# Patient Record
Sex: MALE | Race: WHITE | NOT HISPANIC OR LATINO | Employment: FULL TIME | URBAN - NONMETROPOLITAN AREA
[De-identification: names, ages, dates, MRNs, and addresses within clinical notes are randomized per-mention and may not be internally consistent; named-entity substitution may affect disease eponyms.]

---

## 2017-02-07 ENCOUNTER — OFFICE VISIT (OUTPATIENT)
Dept: CARDIOLOGY | Facility: CLINIC | Age: 57
End: 2017-02-07
Payer: COMMERCIAL

## 2017-02-07 VITALS
WEIGHT: 198 LBS | HEIGHT: 72 IN | SYSTOLIC BLOOD PRESSURE: 160 MMHG | OXYGEN SATURATION: 97 % | BODY MASS INDEX: 26.82 KG/M2 | DIASTOLIC BLOOD PRESSURE: 80 MMHG | HEART RATE: 57 BPM

## 2017-02-07 DIAGNOSIS — I42.0 CARDIOMYOPATHY, DILATED, NONISCHEMIC (HCC): ICD-10-CM

## 2017-02-07 DIAGNOSIS — E78.2 MIXED HYPERLIPIDEMIA: ICD-10-CM

## 2017-02-07 DIAGNOSIS — I10 ESSENTIAL HYPERTENSION: ICD-10-CM

## 2017-02-07 DIAGNOSIS — I34.0 NON-RHEUMATIC MITRAL REGURGITATION: ICD-10-CM

## 2017-02-07 PROCEDURE — 99214 OFFICE O/P EST MOD 30 MIN: CPT | Performed by: INTERNAL MEDICINE

## 2017-02-07 RX ORDER — CARVEDILOL 25 MG/1
25 TABLET ORAL 2 TIMES DAILY WITH MEALS
Qty: 180 TAB | Refills: 3 | Status: SHIPPED | OUTPATIENT
Start: 2017-02-07 | End: 2018-02-16 | Stop reason: SDUPTHER

## 2017-02-07 RX ORDER — ENALAPRIL MALEATE 20 MG/1
20 TABLET ORAL DAILY
Qty: 180 TAB | Refills: 3 | Status: SHIPPED | OUTPATIENT
Start: 2017-02-07 | End: 2017-03-30 | Stop reason: SDUPTHER

## 2017-02-07 ASSESSMENT — ENCOUNTER SYMPTOMS
HEADACHES: 0
SPEECH CHANGE: 0
BRUISES/BLEEDS EASILY: 0
COUGH: 0
NERVOUS/ANXIOUS: 0
LOSS OF CONSCIOUSNESS: 0
DEPRESSION: 0
ORTHOPNEA: 0
MYALGIAS: 0
PALPITATIONS: 0
ABDOMINAL PAIN: 0
CLAUDICATION: 0
PND: 0
BLURRED VISION: 0
SHORTNESS OF BREATH: 0
FEVER: 0

## 2017-02-07 NOTE — Clinical Note
[facility] [address],   [address]nd [address]  [place] [address]  Phone: [phone]  Fax: [phone]              Lobo Jones  1960    Encounter Date: 2/7/2017    Alysa Thakkar M.D.          PROGRESS NOTE:  Subjective:   Lobo Jones is a pleasant 56 y.o. male with known history of NICMP LVEF 26% based on coronary angiogram performed in December 2003, echocardiogram from 2011 normal LV function with mild mitral regurgitation, hypertension, dyslipidemia presenting today for follow-up evaluation of NICMP.    Blood pressure is elevated today. Palpation at home systolic blood pressures are usually around 120s 130s. Denied any complaints of exertional chest pain, pressure or tightness. No complaints of dizziness, lightheadedness or LOC. Denied any complaints of cough while on enalapril. No complaints of lower extremity edema or claudication.      Past Medical History   Diagnosis Date   • Nonischemic cardiomyopathy (CMS-HCC)    • Hyperlipidemia    • Hypertension    • Arrhythmia      hx of irreg heart rate   • Dental disorder      upper partial     Past Surgical History   Procedure Laterality Date   • Inguinal hernia repair       x2   • Colonoscopy - endo  1/12/2015     Performed by Mariano Li M.D. at [facility] ORS     History reviewed. No pertinent family history.  History   Smoking status   • Former Smoker -- 1.00 packs/day for 20 years   • Types: Cigarettes   • Quit date: 01/01/2008   Smokeless tobacco   • Never Used     No Known Allergies  Outpatient Encounter Prescriptions as of 2/7/2017   Medication Sig Dispense Refill   • enalapril (VASOTEC) 20 MG tablet Take 1 Tab by mouth 2 times a day. 180 Tab 0   • carvedilol (COREG) 25 MG Tab Take 1 Tab by mouth 2 times a day, with meals. 180 Tab 4   • aspirin EC (ECOTRIN) 81 MG TBEC Take 81 mg by mouth every day.     • docosahexanoic acid (OMEGA 3 FA) 1000 MG CAPS Take 1,000  "mg by mouth every day.       No facility-administered encounter medications on file as of 2/7/2017.     Review of Systems   Constitutional: Negative for fever.   HENT: Negative for congestion.    Eyes: Negative for blurred vision.   Respiratory: Negative for cough and shortness of breath.    Cardiovascular: Negative for chest pain, palpitations, orthopnea, claudication, leg swelling and PND.   Gastrointestinal: Negative for abdominal pain.   Musculoskeletal: Negative for myalgias and joint pain.   Skin: Negative for rash.   Neurological: Negative for speech change, loss of consciousness and headaches.   Endo/Heme/Allergies: Does not bruise/bleed easily.   Psychiatric/Behavioral: Negative for depression. The patient is not nervous/anxious.    All other systems reviewed and are negative.       Objective:   /80 mmHg  Pulse 57  Ht 1.829 m (6' 0.01\")  Wt 89.812 kg (198 lb)  BMI 26.85 kg/m2  SpO2 97%  Repeat blood pressure 140/84  Physical Exam   Constitutional: He is oriented to person, place, and time. He appears well-developed. No distress.   HENT:   Mouth/Throat: Mucous membranes are normal.   Eyes: Conjunctivae and EOM are normal.   Neck: No JVD present. No tracheal deviation present. No thyroid mass present.   Cardiovascular: Normal rate, regular rhythm and intact distal pulses.    Murmur heard.  Soft systolic murmur best heard in the mitral area   Pulmonary/Chest: Effort normal and breath sounds normal. No respiratory distress. He exhibits no tenderness.   Abdominal: Soft. There is no tenderness.   Musculoskeletal: Normal range of motion. He exhibits no edema.   Neurological: He is alert and oriented to person, place, and time. He has normal strength. He displays no tremor.   Skin: Skin is warm and dry. He is not diaphoretic.   Psychiatric: He has a normal mood and affect. His behavior is normal.   Vitals reviewed.  Results for DLOORES CARISSA TRAN (MRN [id number]) as of 2/7/2017 14:10   10/7/2016    Sodium 142 "   Potassium 4.6   Chloride 107   Co2 26   Anion Gap 14   Glucose 111 (H)   Bun 27 (H)   Creatinine 1.1   GFR If Non African American >60   Calcium 8.7   AST(SGOT) 27   ALT(SGPT) 32   Alkaline Phosphatase 71   Cholesterol,Tot 195   Triglycerides 60   HDL 65.0 (H)   Non HDL Cholesterol 130    (H)   Chol-Hdl Ratio 3.00       Labs 4/24/15  Sodium 135, potassium 4.2, chloride 107, bicarbonate 21, glucose 103, BUN 25, creatinine 0.89  Alkaline phosphatase 46, ALT 22, AST 24  Total cholesterol 180, triglycerides 75, HDL 62,     Labs 9/26/14  Sodium 136, potassium 4.7, chloride 105, bicarbonate 26, glucose 113, BUN 26, creatinine 1  Alkaline phosphatase 45, ALT 24, AST 23.  Cholesterol 204, triglycerides 40, HDL 56,     Transthoracic echo: 9/26/2011  Mild concentric LVH, systolic function normal, EF 69% no wall motion abnormalities. Abnormal diastolic filling with impaired relaxation.  The evidence of coarctation or aneurysm of thoracic aorta.  Aortic valve tricuspid no evidence of stenosis or regurgitation.  Mild mitral regurgitation  Normal right heart pressures.  No pericardial effusion.    Coronary angiogram: 12/16/2003  LV gram showed global hypokinesis with EF of 26%. No mitral regurgitation.  LM bifurcated into the LAD and LCX which is free of disease.  LAD is a large vessel wraps around the apex no significant disease.  LCX angiographically free of CAD  RCA dominant vessel gives off small PDA and PLV free of disease and  Assessment:     1. NICMP  2. HTN  3. Dyslipidemia  4. Mitral regurgitation     Medical Decision Making:  Today's Assessment / Status / Plan:     1. Patient appears euvolemic.  Continue Coreg and enalapril.  2. Repeat blood pressure dropped to 140/84  Advised patient to monitor blood pressure for one week based on the blood pressure recording will titrate antihypertensive agents.  Interim continue Coreg and enalapril.  3. Dietary modification and continue exercise.  4. Repeat  echo in one year prior to next visit.    Followup in one year  Labs in one year, echo prior to next visit.    Thank you for allowing me to participate in taking care of patient.    Alysa Thakkar. MD.             Miguel Wells M.D.  [address] [facility] [address]  VIA In Basket

## 2017-02-07 NOTE — MR AVS SNAPSHOT
"        Lobo Jones   2017 11:00 AM   Office Visit   MRN: [id number]    Department:  Heart [facility]   Dept Phone:  [phone]    Description:  Male : 1960   Provider:  Alysa Thakkar M.D.           Reason for Visit     Follow-Up           Allergies as of 2017     No Known Allergies      You were diagnosed with     Mixed hyperlipidemia   [272.2.ICD-9-CM]       Essential hypertension   [6895601]       Non-rheumatic mitral regurgitation   [008928]       Cardiomyopathy, dilated, nonischemic (CMS-HCC)   [575832]         Vital Signs     Blood Pressure Pulse Height Weight Body Mass Index Oxygen Saturation    160/80 mmHg 57 1.829 m (6' 0.01\") 89.812 kg (198 lb) 26.85 kg/m2 97%    Smoking Status                   Former Smoker           Basic Information     Date Of Birth Sex Race Ethnicity Preferred Language    1960 Male White Non- English      Problem List              ICD-10-CM Priority Class Noted - Resolved    CARDIOMYOPATHY  High  Unknown - Present    Hyperlipidemia E78.5 High  Unknown - Present    Hypertension I10 High  Unknown - Present    Cardiomyopathy, dilated, nonischemic (CMS-HCC) I42.9   10/6/2014 - Present    Mitral regurgitation I34.0   2015 - Present      Health Maintenance        Date Due Completion Dates    DIABETES MONOFILAMENT / LE EXAM 1962 ---    RETINAL SCREENING 1979 ---    URINE ACR / MICROALBUMIN 1979 ---    IMM DTaP/Tdap/Td Vaccine (1 - Tdap) 1980 ---    IMM PNEUMOCOCCAL 19-64 (ADULT) MEDIUM RISK SERIES (1 of 1 - PPSV23) 1980 ---    IMM INFLUENZA (1) 2015    A1C SCREENING 2016, 2015    FASTING LIPID PROFILE 10/7/2017 10/7/2016, 2014    SERUM CREATININE 10/7/2017 10/7/2016, 2014    COLONOSCOPY 1/15/2020 1/15/2015 (Done), 2015    Override on 1/15/2015: Done            Current Immunizations     Hepatitis B Vaccine Recombivax (Adol/Adult) 2013, 2013  8:28 AM, 2013  "    Influenza TIV (IM) 11/18/2015      Below and/or attached are the medications your provider expects you to take. Review all of your home medications and newly ordered medications with your provider and/or pharmacist. Follow medication instructions as directed by your provider and/or pharmacist. Please keep your medication list with you and share with your provider. Update the information when medications are discontinued, doses are changed, or new medications (including over-the-counter products) are added; and carry medication information at all times in the event of emergency situations     Allergies:  No Known Allergies          Medications  Valid as of: February 07, 2017 - 11:18 AM    Generic Name Brand Name Tablet Size Instructions for use    Aspirin (Tablet Delayed Response) ECOTRIN 81 MG Take 81 mg by mouth every day.        Carvedilol (Tab) COREG 25 MG Take 1 Tab by mouth 2 times a day, with meals.        Enalapril Maleate (Tab) VASOTEC 20 MG Take 1 Tab by mouth every day.        Omega-3 Fatty Acids (Cap) OMEGA 3 FA 1000 MG Take 1,000 mg by mouth every day.        .                 Medicines prescribed today were sent to:     [facility] [address] [place] [address] - [address] [place], [place] [address] [facility] [address]    Phone: [phone] Fax: [phone]    Open 24 Hours?: No      Medication refill instructions:       If your prescription bottle indicates you have medication refills left, it is not necessary to call your provider’s office. Please contact your pharmacy and they will refill your medication.    If your prescription bottle indicates you do not have any refills left, you may request refills at any time through one of the following ways: The online [employer] system (except Urgent Care), by calling your provider’s office, or by asking your pharmacy to contact your provider’s office with a refill request. Medication refills are processed only during regular business hours and may not be  available until the next business day. Your provider may request additional information or to have a follow-up visit with you prior to refilling your medication.   *Please Note: Medication refills are assigned a new Rx number when refilled electronically. Your pharmacy may indicate that no refills were authorized even though a new prescription for the same medication is available at the pharmacy. Please request the medicine by name with the pharmacy before contacting your provider for a refill.        Your To Do List     Future Labs/Procedures Complete By Expires    COMP METABOLIC PANEL  As directed 2/7/2018    Echocardiogram Comp w/o Cont  As directed 2/7/2018    Scheduling Instructions:    In 1 year    LIPID PROFILE  As directed 2/7/2018         bSafe Access Code: [email or web address]  Expires: 3/9/2017 10:48 AM    bSafe  A secure, online tool to manage your health information     [employer]’s bSafe® is a secure, online tool that connects you to your personalized health information from the privacy of your home -- day or night - making it very easy for you to manage your healthcare. Once the activation process is completed, you can even access your medical information using the bSafe luz, which is available for free in the Apple Luz store or Google Play store.     bSafe provides the following levels of access (as shown below):   My Chart Features   [facility] Doctor [facility]  Specialists [facility]  Urgent  Care Non-[facility]  Primary Care  Doctor   Email your healthcare team securely and privately 24/7 X X X    Manage appointments: schedule your next appointment; view details of past/upcoming appointments X      Request prescription refills. X      View recent personal medical records, including lab and immunizations X X X X   View health record, including health history, allergies, medications X X X X   Read reports about your outpatient visits, procedures, consult and ER notes X X X X   See your  discharge summary, which is a recap of your hospital and/or ER visit that includes your diagnosis, lab results, and care plan. X X       How to register for The Rounds:  1. Go to  [email or web address].  2. Click on the Sign Up Now box, which takes you to the New Member Sign Up page. You will need to provide the following information:  a. Enter your The Rounds Access Code exactly as it appears at the top of this page. (You will not need to use this code after you’ve completed the sign-up process. If you do not sign up before the expiration date, you must request a new code.)   b. Enter your date of birth.   c. Enter your home email address.   d. Click Submit, and follow the next screen’s instructions.  3. Create a The Rounds ID. This will be your The Rounds login ID and cannot be changed, so think of one that is secure and easy to remember.  4. Create a The Rounds password. You can change your password at any time.  5. Enter your Password Reset Question and Answer. This can be used at a later time if you forget your password.   6. Enter your e-mail address. This allows you to receive e-mail notifications when new information is available in The Rounds.  7. Click Sign Up. You can now view your health information.    For assistance activating your The Rounds account, call [phone]

## 2017-02-07 NOTE — PROGRESS NOTES
Subjective:   Lobo Jones is a pleasant 56 y.o. male with known history of NICMP LVEF 26% based on coronary angiogram performed in December 2003, echocardiogram from 2011 normal LV function with mild mitral regurgitation, hypertension, dyslipidemia presenting today for follow-up evaluation of NICMP.    Blood pressure is elevated today. Palpation at home systolic blood pressures are usually around 120s 130s. Denied any complaints of exertional chest pain, pressure or tightness. No complaints of dizziness, lightheadedness or LOC. Denied any complaints of cough while on enalapril. No complaints of lower extremity edema or claudication.      Past Medical History   Diagnosis Date   • Nonischemic cardiomyopathy (CMS-HCC)    • Hyperlipidemia    • Hypertension    • Arrhythmia      hx of irreg heart rate   • Dental disorder      upper partial     Past Surgical History   Procedure Laterality Date   • Inguinal hernia repair       x2   • Colonoscopy - endo  1/12/2015     Performed by Mariano Li M.D. at [facility] ORS     History reviewed. No pertinent family history.  History   Smoking status   • Former Smoker -- 1.00 packs/day for 20 years   • Types: Cigarettes   • Quit date: 01/01/2008   Smokeless tobacco   • Never Used     No Known Allergies  Outpatient Encounter Prescriptions as of 2/7/2017   Medication Sig Dispense Refill   • enalapril (VASOTEC) 20 MG tablet Take 1 Tab by mouth 2 times a day. 180 Tab 0   • carvedilol (COREG) 25 MG Tab Take 1 Tab by mouth 2 times a day, with meals. 180 Tab 4   • aspirin EC (ECOTRIN) 81 MG TBEC Take 81 mg by mouth every day.     • docosahexanoic acid (OMEGA 3 FA) 1000 MG CAPS Take 1,000 mg by mouth every day.       No facility-administered encounter medications on file as of 2/7/2017.     Review of Systems   Constitutional: Negative for fever.   HENT: Negative for congestion.    Eyes: Negative for blurred vision.   Respiratory: Negative for cough and shortness of  "breath.    Cardiovascular: Negative for chest pain, palpitations, orthopnea, claudication, leg swelling and PND.   Gastrointestinal: Negative for abdominal pain.   Musculoskeletal: Negative for myalgias and joint pain.   Skin: Negative for rash.   Neurological: Negative for speech change, loss of consciousness and headaches.   Endo/Heme/Allergies: Does not bruise/bleed easily.   Psychiatric/Behavioral: Negative for depression. The patient is not nervous/anxious.    All other systems reviewed and are negative.       Objective:   /80 mmHg  Pulse 57  Ht 1.829 m (6' 0.01\")  Wt 89.812 kg (198 lb)  BMI 26.85 kg/m2  SpO2 97%  Repeat blood pressure 140/84  Physical Exam   Constitutional: He is oriented to person, place, and time. He appears well-developed. No distress.   HENT:   Mouth/Throat: Mucous membranes are normal.   Eyes: Conjunctivae and EOM are normal.   Neck: No JVD present. No tracheal deviation present. No thyroid mass present.   Cardiovascular: Normal rate, regular rhythm and intact distal pulses.    Murmur heard.  Soft systolic murmur best heard in the mitral area   Pulmonary/Chest: Effort normal and breath sounds normal. No respiratory distress. He exhibits no tenderness.   Abdominal: Soft. There is no tenderness.   Musculoskeletal: Normal range of motion. He exhibits no edema.   Neurological: He is alert and oriented to person, place, and time. He has normal strength. He displays no tremor.   Skin: Skin is warm and dry. He is not diaphoretic.   Psychiatric: He has a normal mood and affect. His behavior is normal.   Vitals reviewed.  Results for ANNCARISSA JONES TRAN (MRN [id number]) as of 2/7/2017 14:10   10/7/2016    Sodium 142   Potassium 4.6   Chloride 107   Co2 26   Anion Gap 14   Glucose 111 (H)   Bun 27 (H)   Creatinine 1.1   GFR If Non African American >60   Calcium 8.7   AST(SGOT) 27   ALT(SGPT) 32   Alkaline Phosphatase 71   Cholesterol,Tot 195   Triglycerides 60   HDL 65.0 (H)   Non HDL " Cholesterol 130    (H)   Chol-Hdl Ratio 3.00       Labs 4/24/15  Sodium 135, potassium 4.2, chloride 107, bicarbonate 21, glucose 103, BUN 25, creatinine 0.89  Alkaline phosphatase 46, ALT 22, AST 24  Total cholesterol 180, triglycerides 75, HDL 62,     Labs 9/26/14  Sodium 136, potassium 4.7, chloride 105, bicarbonate 26, glucose 113, BUN 26, creatinine 1  Alkaline phosphatase 45, ALT 24, AST 23.  Cholesterol 204, triglycerides 40, HDL 56,     Transthoracic echo: 9/26/2011  Mild concentric LVH, systolic function normal, EF 69% no wall motion abnormalities. Abnormal diastolic filling with impaired relaxation.  The evidence of coarctation or aneurysm of thoracic aorta.  Aortic valve tricuspid no evidence of stenosis or regurgitation.  Mild mitral regurgitation  Normal right heart pressures.  No pericardial effusion.    Coronary angiogram: 12/16/2003  LV gram showed global hypokinesis with EF of 26%. No mitral regurgitation.  LM bifurcated into the LAD and LCX which is free of disease.  LAD is a large vessel wraps around the apex no significant disease.  LCX angiographically free of CAD  RCA dominant vessel gives off small PDA and PLV free of disease and  Assessment:     1. NICMP  2. HTN  3. Dyslipidemia  4. Mitral regurgitation     Medical Decision Making:  Today's Assessment / Status / Plan:     1. Patient appears euvolemic.  Continue Coreg and enalapril.  2. Repeat blood pressure dropped to 140/84  Advised patient to monitor blood pressure for one week based on the blood pressure recording will titrate antihypertensive agents.  Interim continue Coreg and enalapril.  3. Dietary modification and continue exercise.  4. Repeat echo in one year prior to next visit.    Followup in one year  Labs in one year, echo prior to next visit.    Thank you for allowing me to participate in taking care of patient.    Alysa Khalil MD.

## 2017-03-30 DIAGNOSIS — I10 ESSENTIAL HYPERTENSION: ICD-10-CM

## 2017-03-30 DIAGNOSIS — I42.0 CARDIOMYOPATHY, DILATED, NONISCHEMIC (HCC): ICD-10-CM

## 2017-03-30 RX ORDER — ENALAPRIL MALEATE 20 MG/1
20 TABLET ORAL 2 TIMES DAILY
Qty: 180 TAB | Refills: 3 | OUTPATIENT
Start: 2017-03-30 | End: 2018-02-16 | Stop reason: SDUPTHER

## 2018-02-09 DIAGNOSIS — E78.2 MIXED HYPERLIPIDEMIA: ICD-10-CM

## 2018-02-14 ENCOUNTER — APPOINTMENT (OUTPATIENT)
Dept: CARDIOLOGY | Facility: MEDICAL CENTER | Age: 58
End: 2018-02-14
Attending: INTERNAL MEDICINE
Payer: COMMERCIAL

## 2018-02-16 ENCOUNTER — OFFICE VISIT (OUTPATIENT)
Dept: CARDIOLOGY | Facility: PHYSICIAN GROUP | Age: 58
End: 2018-02-16
Payer: COMMERCIAL

## 2018-02-16 VITALS
DIASTOLIC BLOOD PRESSURE: 82 MMHG | BODY MASS INDEX: 26.95 KG/M2 | SYSTOLIC BLOOD PRESSURE: 140 MMHG | WEIGHT: 199 LBS | HEART RATE: 58 BPM | HEIGHT: 72 IN | OXYGEN SATURATION: 95 %

## 2018-02-16 DIAGNOSIS — E78.2 MIXED HYPERLIPIDEMIA: ICD-10-CM

## 2018-02-16 DIAGNOSIS — I42.0 CARDIOMYOPATHY, DILATED, NONISCHEMIC (HCC): ICD-10-CM

## 2018-02-16 DIAGNOSIS — I10 ESSENTIAL HYPERTENSION: ICD-10-CM

## 2018-02-16 PROCEDURE — 99214 OFFICE O/P EST MOD 30 MIN: CPT | Performed by: INTERNAL MEDICINE

## 2018-02-16 RX ORDER — CARVEDILOL 25 MG/1
25 TABLET ORAL 2 TIMES DAILY WITH MEALS
Qty: 180 TAB | Refills: 3 | Status: SHIPPED | OUTPATIENT
Start: 2018-02-16 | End: 2019-01-09 | Stop reason: SDUPTHER

## 2018-02-16 RX ORDER — ENALAPRIL MALEATE 20 MG/1
20 TABLET ORAL 2 TIMES DAILY
Qty: 60 TAB | Refills: 11 | Status: SHIPPED | OUTPATIENT
Start: 2018-02-16 | End: 2018-08-27

## 2018-02-16 ASSESSMENT — ENCOUNTER SYMPTOMS
CLAUDICATION: 0
ORTHOPNEA: 0
COUGH: 0
SPEECH CHANGE: 0
HEADACHES: 0
LOSS OF CONSCIOUSNESS: 0
FEVER: 0
NERVOUS/ANXIOUS: 0
BRUISES/BLEEDS EASILY: 0
SHORTNESS OF BREATH: 0
BLURRED VISION: 0
ABDOMINAL PAIN: 0
DEPRESSION: 0
PND: 0
PALPITATIONS: 0
MYALGIAS: 0

## 2018-02-16 NOTE — PROGRESS NOTES
Subjective:   Lobo Jones is a pleasant 56 y.o. male with known history of   1. NICMP LVEF 26% based on coronary angiogram performed in December 2003, Echocardiogram from 2011 normal LV function   2. Mitral regurgitation  3. Hypertension  4. Dyslipidemia   Presenting today for follow-up evaluation of NICMP.    Patient is working out on a treadmill every third day. Is able to walk for 20 minutes at a speed of 3 or 5 miles. Denied any complaints of exertional chest pain, pressure or tightness. No complaints of dyspnea at rest or on exertion. Denied any complaints of orthopnea or PND. No complaints of dizziness lightheadedness or LOC. Recent lab work did show elevated glucose as well as LDL level patient wants to try dietary modification and exercise before considering medications.      Past Medical History:   Diagnosis Date   • Arrhythmia     hx of irreg heart rate   • Dental disorder     upper partial   • Hyperlipidemia    • Hypertension    • Nonischemic cardiomyopathy (CMS-HCC)      Past Surgical History:   Procedure Laterality Date   • COLONOSCOPY - ENDO  1/12/2015    Performed by Mariano Li M.D. at SURGERY [facility] ORS   • INGUINAL HERNIA REPAIR      x2     No family history on file.  History   Smoking Status   • Former Smoker   • Packs/day: 1.00   • Years: 20.00   • Types: Cigarettes   • Quit date: 1/1/2008   Smokeless Tobacco   • Never Used     No Known Allergies  Outpatient Encounter Prescriptions as of 2/16/2018   Medication Sig Dispense Refill   • enalapril (VASOTEC) 20 MG tablet Take 1 Tab by mouth 2 times a day. 60 Tab 11   • carvedilol (COREG) 25 MG Tab Take 1 Tab by mouth 2 times a day, with meals. 180 Tab 3   • aspirin EC (ECOTRIN) 81 MG TBEC Take 81 mg by mouth every day.     • docosahexanoic acid (OMEGA 3 FA) 1000 MG CAPS Take 1,000 mg by mouth every day.     • [DISCONTINUED] enalapril (VASOTEC) 20 MG tablet Take 1 Tab by mouth 2 times a day. 180 Tab 3   • [DISCONTINUED] carvedilol  (COREG) 25 MG Tab Take 1 Tab by mouth 2 times a day, with meals. 180 Tab 3     No facility-administered encounter medications on file as of 2/16/2018.      Review of Systems   Constitutional: Negative for fever.   HENT: Negative for congestion.    Eyes: Negative for blurred vision.   Respiratory: Negative for cough and shortness of breath.    Cardiovascular: Negative for chest pain, palpitations, orthopnea, claudication, leg swelling and PND.   Gastrointestinal: Negative for abdominal pain.   Genitourinary: Negative for hematuria.   Musculoskeletal: Negative for joint pain and myalgias.   Skin: Negative for rash.   Neurological: Negative for speech change, loss of consciousness and headaches.   Endo/Heme/Allergies: Does not bruise/bleed easily.   Psychiatric/Behavioral: Negative for depression. The patient is not nervous/anxious.    All other systems reviewed and are negative.       Objective:   /82   Pulse (!) 58   Ht 1.829 m (6')   Wt 90.3 kg (199 lb)   SpO2 95%   BMI 26.99 kg/m²     Physical Exam   Constitutional: He is oriented to person, place, and time. He appears well-developed. No distress.   HENT:   Mouth/Throat: Mucous membranes are normal.   Eyes: Conjunctivae and EOM are normal.   Neck: No JVD present. No tracheal deviation present. No thyroid mass present.   Cardiovascular: Normal rate, regular rhythm and intact distal pulses.    Murmur heard.  Soft systolic murmur best heard in the mitral area   Pulmonary/Chest: Effort normal and breath sounds normal. No respiratory distress. He exhibits no tenderness.   Abdominal: Soft. There is no tenderness.   Musculoskeletal: Normal range of motion. He exhibits no edema.   Neurological: He is alert and oriented to person, place, and time. He has normal strength. He displays no tremor.   Skin: Skin is warm and dry. He is not diaphoretic.   Psychiatric: He has a normal mood and affect. His behavior is normal.   Vitals reviewed.  Labs: 2/8/18  Total  cholesterol 181, triglycerides 30, HDL 58,   Sodium 141, potassium 4.2, chloride 110, bicarbonate 23, glucose 123, BUN 24  ALT 20, AST 22, alkaline phosphatase 61  Hemoglobin A1c 6.1    Transthoracic echo: 9/26/2011  Mild concentric LVH, systolic function normal, EF 69% no wall motion abnormalities. Abnormal diastolic filling with impaired relaxation.  The evidence of coarctation or aneurysm of thoracic aorta.  Aortic valve tricuspid no evidence of stenosis or regurgitation.  Mild mitral regurgitation  Normal right heart pressures.  No pericardial effusion.    Coronary angiogram: 12/16/2003  LV gram showed global hypokinesis with EF of 26%. No mitral regurgitation.  LM bifurcated into the LAD and LCX which is free of disease.  LAD is a large vessel wraps around the apex no significant disease.  LCX angiographically free of CAD  RCA dominant vessel gives off small PDA and PLV free of disease and  Assessment:     1. NICMP  2. HTN  3. Dyslipidemia  4. Mitral regurgitation     Medical Decision Making:  Today's Assessment / Status / Plan:     1. Patient currently in NYHA class I..  Continue Coreg and enalapril.  2. Advised patient to monitor blood pressure at home based on the blood pressure recordings will titrate antihypertensive agents.  Continue Coreg and enalapril.  3. Patient doesn't want to go on statins would like to try dietary modification along with increased physical activity.  4. Echo prior to next visit.    Echo labs prior to next visit.   Follow-up in one year.    Thank you for allowing me to participate in taking care of patient.    Alysa Khalil MD.

## 2018-12-10 ENCOUNTER — TELEPHONE (OUTPATIENT)
Dept: CARDIOLOGY | Facility: PHYSICIAN GROUP | Age: 58
End: 2018-12-10

## 2018-12-10 NOTE — TELEPHONE ENCOUNTER
[place] [facility] [phone][place]

## 2019-01-09 ENCOUNTER — OFFICE VISIT (OUTPATIENT)
Dept: CARDIOLOGY | Facility: PHYSICIAN GROUP | Age: 59
End: 2019-01-09
Payer: COMMERCIAL

## 2019-01-09 VITALS
HEIGHT: 70 IN | WEIGHT: 202 LBS | DIASTOLIC BLOOD PRESSURE: 80 MMHG | OXYGEN SATURATION: 94 % | SYSTOLIC BLOOD PRESSURE: 122 MMHG | HEART RATE: 58 BPM | BODY MASS INDEX: 28.92 KG/M2

## 2019-01-09 DIAGNOSIS — I10 ESSENTIAL HYPERTENSION: ICD-10-CM

## 2019-01-09 DIAGNOSIS — E78.2 MIXED HYPERLIPIDEMIA: ICD-10-CM

## 2019-01-09 DIAGNOSIS — I42.0 CARDIOMYOPATHY, DILATED, NONISCHEMIC (HCC): ICD-10-CM

## 2019-01-09 PROCEDURE — 99214 OFFICE O/P EST MOD 30 MIN: CPT | Performed by: NURSE PRACTITIONER

## 2019-01-09 RX ORDER — ENALAPRIL MALEATE 20 MG/1
20 TABLET ORAL DAILY
Qty: 90 TAB | Refills: 3 | Status: SHIPPED | OUTPATIENT
Start: 2019-01-09 | End: 2019-03-04 | Stop reason: SDUPTHER

## 2019-01-09 RX ORDER — CARVEDILOL 25 MG/1
25 TABLET ORAL 2 TIMES DAILY WITH MEALS
Qty: 180 TAB | Refills: 3 | Status: SHIPPED | OUTPATIENT
Start: 2019-01-09 | End: 2020-01-24 | Stop reason: SDUPTHER

## 2019-01-09 RX ORDER — PENICILLIN V POTASSIUM 500 MG/1
TABLET ORAL
COMMUNITY
Start: 2018-10-17 | End: 2021-06-17

## 2019-01-09 ASSESSMENT — ENCOUNTER SYMPTOMS
CHILLS: 0
FEVER: 0
INSOMNIA: 0
BRUISES/BLEEDS EASILY: 0
ORTHOPNEA: 0
ABDOMINAL PAIN: 0
LOSS OF CONSCIOUSNESS: 0
SHORTNESS OF BREATH: 0
COUGH: 0
DIZZINESS: 0
HEADACHES: 0
PALPITATIONS: 0
MYALGIAS: 0
NAUSEA: 0
PND: 0

## 2019-01-09 NOTE — PROGRESS NOTES
Chief Complaint   Patient presents with   • Follow-Up   • Cardiomyopathy (Non-ischemic)   • HTN (Controlled)   • Hyperlipidemia       Subjective:   Lobo Jones is a 58 y.o. male who presents today for annual follow-up of history of dilated cardiomyopathy, hypertension and hyperlipidemia.    Lobo is a 58 year old male with history of dilated cardiomyopathy in 2003 with LVEF 25%, normalized with medication on subsequent echocardiograms, hypertension and hyperlipidemia, previously followed by Dr. Thakkar.    Since the last visit, he has been doing well. He remains active and tries to exercise regularly, which is tolerated without any problems. No chest pain, pressure or discomfort; no palpitations; no shortness of breath, orthopnea or PND; no dizziness, lightheadedness or syncope; no LE edema. BP has been stable; mention of changing from Vasotec to Vasoretic was mentioned by PCP, but he has not done this. BP has been fine at home. He did have an OPO done recently too.    Past Medical History:   Diagnosis Date   • Dental disorder     Upper partial   • Hyperlipidemia    • Hypertension    • Nonischemic cardiomyopathy (HCC) 12/2003    Coronary angiogram with LVEF 25%. Normalization of LVEF with medication. December 2018: Echocardiogram with normal LV size, LVEF 65%. No valvular abnormalities.     Past Surgical History:   Procedure Laterality Date   • COLONOSCOPY - ENDO  1/12/2015    Performed by Mariano Li M.D. at [facility]   • INGUINAL HERNIA REPAIR      x2     History reviewed. No pertinent family history.  Social History     Social History   • Marital status: Single     Spouse name: N/A   • Number of children: 2   • Years of education: N/A     Occupational History   •  [employer]     Social History Main Topics   • Smoking status: Former Smoker     Packs/day: 1.00     Years: 20.00     Types: Cigarettes     Quit date: 1/1/2008   • Smokeless tobacco: Never Used   • Alcohol use 1.5 - 2.0  "oz/week     3 - 4 Cans of beer per week   • Drug use: No   • Sexual activity: Not on file     Other Topics Concern   • Not on file     Social History Narrative   • No narrative on file     No Known Allergies  Outpatient Encounter Prescriptions as of 1/9/2019   Medication Sig Dispense Refill   • carvedilol (COREG) 25 MG Tab Take 1 Tab by mouth 2 times a day, with meals. 180 Tab 3   • enalapril (VASOTEC) 20 MG tablet Take 1 Tab by mouth every day. 90 Tab 3   • aspirin EC (ECOTRIN) 81 MG TBEC Take 81 mg by mouth every day.     • docosahexanoic acid (OMEGA 3 FA) 1000 MG CAPS Take 1,000 mg by mouth every day.     • penicillin v potassium (VEETID) 500 MG Tab      • [DISCONTINUED] enalapril-hydrochlorthiazide (VASERETIC) 10-25 MG per tablet Take 1 Tab by mouth every day. (Patient not taking: Reported on 1/9/2019) 30 Tab 11   • [DISCONTINUED] enalapril (VASOTEC) 20 MG tablet Take 1 Tab by mouth every day. 90 Tab 3   • [DISCONTINUED] carvedilol (COREG) 25 MG Tab Take 1 Tab by mouth 2 times a day, with meals. 180 Tab 3     No facility-administered encounter medications on file as of 1/9/2019.      Review of Systems   Constitutional: Negative for chills and fever.   HENT: Negative for congestion.    Respiratory: Negative for cough and shortness of breath.    Cardiovascular: Negative for chest pain, palpitations, orthopnea, leg swelling and PND.   Gastrointestinal: Negative for abdominal pain and nausea.   Musculoskeletal: Negative for myalgias.   Skin: Negative for rash.   Neurological: Negative for dizziness, loss of consciousness and headaches.   Endo/Heme/Allergies: Does not bruise/bleed easily.   Psychiatric/Behavioral: The patient does not have insomnia.         Objective:   /80 (BP Location: Left arm, Patient Position: Sitting, BP Cuff Size: Adult)   Pulse (!) 58   Ht 1.778 m (5' 10\")   Wt 91.6 kg (202 lb)   SpO2 94%   BMI 28.98 kg/m²     Physical Exam   Constitutional: He is oriented to person, place, and " time. He appears well-developed and well-nourished.   HENT:   Head: Normocephalic.   Eyes: EOM are normal.   Neck: Normal range of motion. Neck supple. No JVD present.   Cardiovascular: Normal rate, regular rhythm and normal heart sounds.    Pulmonary/Chest: Effort normal and breath sounds normal. No respiratory distress. He has no wheezes. He has no rales.   Abdominal: Soft. Bowel sounds are normal. He exhibits no distension. There is no tenderness.   Musculoskeletal: Normal range of motion. He exhibits no edema.   Neurological: He is alert and oriented to person, place, and time.   Skin: Skin is warm and dry. No rash noted.   Psychiatric: He has a normal mood and affect.     CONCLUSIONS OF ECHOCARDIOGRAM OF 12/28/2018:  No previous exam for comparison.  Normal left ventricular systolic function.  Left ventricular ejection fraction is visually estimated to be 65%.  Normal diastolic function.  Aortic sclerosis without stenosis.  Estimated right ventricular systolic pressure  is 25 mmHg + JVP.    LABS AS OF 8/10/2018:  Glucose 110  BUN 19  Creatinine 0.9  Potassium 4.1  ALT 28  AST 80  GFR 87  Cholesterol 190  Triglycerides 46  HDL 53    Cholesterol/HDL ratio 3.6    OPO OF 12/27/2018:  Time with oxygen saturation <90% 29.1% of total time.    Assessment:     1. Cardiomyopathy, dilated, nonischemic (HCC)  carvedilol (COREG) 25 MG Tab    enalapril (VASOTEC) 20 MG tablet   2. Essential hypertension  carvedilol (COREG) 25 MG Tab    enalapril (VASOTEC) 20 MG tablet   3. Mixed hyperlipidemia         Medical Decision Making:  Today's Assessment / Status / Plan:     1. History of dilated cardiomyopathy with normalization of LVEF on echocardiogram. Recent echo confirms this. Continue ASA, BB and ACEI.    2. Hypertension, treated and stable. For now, OK to stay on just Vasotec. Monitor BP at home.    3. Hyperlipidemia, not currently on any therapy. Last lipid panel was satisfactory. Keep working on diet and  exercise.    Same medications for now. To discuss OPO with PCP. FU with us annually, sooner if clinical condition changes.    Collaborating MD: FELECIA Horner

## 2019-01-09 NOTE — LETTER
[facility] [address]  Phone: [phone]  Fax: [phone]              Lobo Jones  1960    Encounter Date: 1/9/2019    KENISHA Apple          PROGRESS NOTE:  Chief Complaint   Patient presents with   • Follow-Up   • Cardiomyopathy (Non-ischemic)   • HTN (Controlled)   • Hyperlipidemia       Subjective:   Lobo Jones is a 58 y.o. male who presents today for annual follow-up of history of dilated cardiomyopathy, hypertension and hyperlipidemia.    Lobo is a 58 year old male with history of dilated cardiomyopathy in 2003 with LVEF 25%, normalized with medication on subsequent echocardiograms, hypertension and hyperlipidemia, previously followed by Dr. Thakkar.    Since the last visit, he has been doing well. He remains active and tries to exercise regularly, which is tolerated without any problems. No chest pain, pressure or discomfort; no palpitations; no shortness of breath, orthopnea or PND; no dizziness, lightheadedness or syncope; no LE edema. BP has been stable; mention of changing from Vasotec to Vasoretic was mentioned by PCP, but he has not done this. BP has been fine at home. He did have an OPO done recently too.    Past Medical History:   Diagnosis Date   • Dental disorder     Upper partial   • Hyperlipidemia    • Hypertension    • Nonischemic cardiomyopathy (HCC) 12/2003    Coronary angiogram with LVEF 25%. Normalization of LVEF with medication. December 2018: Echocardiogram with normal LV size, LVEF 65%. No valvular abnormalities.     Past Surgical History:   Procedure Laterality Date   • COLONOSCOPY - ENDO  1/12/2015    Performed by Mariano Li M.D. at SURGERY [facility]S   • INGUINAL HERNIA REPAIR      x2     History reviewed. No pertinent family history.  Social History     Social History   • Marital status: Single     Spouse name: N/A   • Number of children: 2   • Years of  education: N/A     Occupational History   •  [employer]     Social History Main Topics   • Smoking status: Former Smoker     Packs/day: 1.00     Years: 20.00     Types: Cigarettes     Quit date: 1/1/2008   • Smokeless tobacco: Never Used   • Alcohol use 1.5 - 2.0 oz/week     3 - 4 Cans of beer per week   • Drug use: No   • Sexual activity: Not on file     Other Topics Concern   • Not on file     Social History Narrative   • No narrative on file     No Known Allergies  Outpatient Encounter Prescriptions as of 1/9/2019   Medication Sig Dispense Refill   • carvedilol (COREG) 25 MG Tab Take 1 Tab by mouth 2 times a day, with meals. 180 Tab 3   • enalapril (VASOTEC) 20 MG tablet Take 1 Tab by mouth every day. 90 Tab 3   • aspirin EC (ECOTRIN) 81 MG TBEC Take 81 mg by mouth every day.     • docosahexanoic acid (OMEGA 3 FA) 1000 MG CAPS Take 1,000 mg by mouth every day.     • penicillin v potassium (VEETID) 500 MG Tab      • [DISCONTINUED] enalapril-hydrochlorthiazide (VASERETIC) 10-25 MG per tablet Take 1 Tab by mouth every day. (Patient not taking: Reported on 1/9/2019) 30 Tab 11   • [DISCONTINUED] enalapril (VASOTEC) 20 MG tablet Take 1 Tab by mouth every day. 90 Tab 3   • [DISCONTINUED] carvedilol (COREG) 25 MG Tab Take 1 Tab by mouth 2 times a day, with meals. 180 Tab 3     No facility-administered encounter medications on file as of 1/9/2019.      Review of Systems   Constitutional: Negative for chills and fever.   HENT: Negative for congestion.    Respiratory: Negative for cough and shortness of breath.    Cardiovascular: Negative for chest pain, palpitations, orthopnea, leg swelling and PND.   Gastrointestinal: Negative for abdominal pain and nausea.   Musculoskeletal: Negative for myalgias.   Skin: Negative for rash.   Neurological: Negative for dizziness, loss of consciousness and headaches.   Endo/Heme/Allergies: Does not bruise/bleed easily.   Psychiatric/Behavioral: The patient does not have insomnia.          "Objective:   /80 (BP Location: Left arm, Patient Position: Sitting, BP Cuff Size: Adult)   Pulse (!) 58   Ht 1.778 m (5' 10\")   Wt 91.6 kg (202 lb)   SpO2 94%   BMI 28.98 kg/m²      Physical Exam   Constitutional: He is oriented to person, place, and time. He appears well-developed and well-nourished.   HENT:   Head: Normocephalic.   Eyes: EOM are normal.   Neck: Normal range of motion. Neck supple. No JVD present.   Cardiovascular: Normal rate, regular rhythm and normal heart sounds.    Pulmonary/Chest: Effort normal and breath sounds normal. No respiratory distress. He has no wheezes. He has no rales.   Abdominal: Soft. Bowel sounds are normal. He exhibits no distension. There is no tenderness.   Musculoskeletal: Normal range of motion. He exhibits no edema.   Neurological: He is alert and oriented to person, place, and time.   Skin: Skin is warm and dry. No rash noted.   Psychiatric: He has a normal mood and affect.     CONCLUSIONS OF ECHOCARDIOGRAM OF 12/28/2018:  No previous exam for comparison.  Normal left ventricular systolic function.  Left ventricular ejection fraction is visually estimated to be 65%.  Normal diastolic function.  Aortic sclerosis without stenosis.  Estimated right ventricular systolic pressure  is 25 mmHg + JVP.    LABS AS OF 8/10/2018:  Glucose 110  BUN 19  Creatinine 0.9  Potassium 4.1  ALT 28  AST 80  GFR 87  Cholesterol 190  Triglycerides 46  HDL 53    Cholesterol/HDL ratio 3.6    OPO OF 12/27/2018:  Time with oxygen saturation <90% 29.1% of total time.    Assessment:     1. Cardiomyopathy, dilated, nonischemic (HCC)  carvedilol (COREG) 25 MG Tab    enalapril (VASOTEC) 20 MG tablet   2. Essential hypertension  carvedilol (COREG) 25 MG Tab    enalapril (VASOTEC) 20 MG tablet   3. Mixed hyperlipidemia         Medical Decision Making:  Today's Assessment / Status / Plan:     1. History of dilated cardiomyopathy with normalization of LVEF on echocardiogram. Recent echo " confirms this. Continue ASA, BB and ACEI.    2. Hypertension, treated and stable. For now, OK to stay on just Vasotec. Monitor BP at home.    3. Hyperlipidemia, not currently on any therapy. Last lipid panel was satisfactory. Keep working on diet and exercise.    Same medications for now. To discuss OPO with PCP. FU with us annually, sooner if clinical condition changes.    Collaborating MD: FELECIA Horner       No Recipients

## 2019-03-04 DIAGNOSIS — I10 ESSENTIAL HYPERTENSION: ICD-10-CM

## 2019-03-04 DIAGNOSIS — I42.0 CARDIOMYOPATHY, DILATED, NONISCHEMIC (HCC): ICD-10-CM

## 2019-03-04 RX ORDER — ENALAPRIL MALEATE 20 MG/1
20 TABLET ORAL 2 TIMES DAILY
Qty: 180 TAB | Refills: 3 | Status: SHIPPED | OUTPATIENT
Start: 2019-03-04 | End: 2020-03-02 | Stop reason: RX

## 2020-01-24 DIAGNOSIS — I42.0 CARDIOMYOPATHY, DILATED, NONISCHEMIC (HCC): ICD-10-CM

## 2020-01-24 DIAGNOSIS — I10 ESSENTIAL HYPERTENSION: ICD-10-CM

## 2020-01-24 RX ORDER — CARVEDILOL 25 MG/1
25 TABLET ORAL 2 TIMES DAILY WITH MEALS
Qty: 180 TAB | Refills: 3 | Status: SHIPPED | OUTPATIENT
Start: 2020-01-24 | End: 2021-01-19

## 2020-02-28 ENCOUNTER — TELEPHONE (OUTPATIENT)
Dept: CARDIOLOGY | Facility: MEDICAL CENTER | Age: 60
End: 2020-02-28

## 2020-02-29 NOTE — TELEPHONE ENCOUNTER
[facility] needs replacement medication for Enalapril 20 mg BID.  This drug is on Backorder.     To Mary ANGELA

## 2020-03-02 RX ORDER — LISINOPRIL 20 MG/1
20 TABLET ORAL 2 TIMES DAILY
Qty: 60 TAB | Refills: 11 | Status: SHIPPED | OUTPATIENT
Start: 2020-03-02 | End: 2020-04-01 | Stop reason: RX

## 2020-04-01 RX ORDER — LISINOPRIL 20 MG/1
20 TABLET ORAL 2 TIMES DAILY
Qty: 60 TAB | Refills: 11 | Status: SHIPPED | OUTPATIENT
Start: 2020-04-01 | End: 2021-03-08

## 2020-04-01 RX ORDER — ENALAPRIL MALEATE 20 MG/1
20 TABLET ORAL 2 TIMES DAILY
Qty: 180 TAB | Refills: 3 | Status: SHIPPED
Start: 2020-04-01 | End: 2020-04-01

## 2021-01-17 DIAGNOSIS — I42.0 CARDIOMYOPATHY, DILATED, NONISCHEMIC (HCC): ICD-10-CM

## 2021-01-17 DIAGNOSIS — I10 ESSENTIAL HYPERTENSION: ICD-10-CM

## 2021-01-19 RX ORDER — CARVEDILOL 25 MG/1
TABLET ORAL
Qty: 30 TAB | Refills: 0 | Status: SHIPPED | OUTPATIENT
Start: 2021-01-19 | End: 2021-02-18 | Stop reason: SDUPTHER

## 2021-02-04 ENCOUNTER — TELEPHONE (OUTPATIENT)
Dept: CARDIOLOGY | Facility: MEDICAL CENTER | Age: 61
End: 2021-02-04

## 2021-02-04 NOTE — TELEPHONE ENCOUNTER
AB    NM: Lobo Jones    PH: [phone]   PT NM: Lobo Jones    : 62   REG DR: Mary Lozano/Dr Goldberg    RE: Has called several times with out  a reply has an appt on 3/29  on  21  sent a out of network  referral request form to office needs  to confirm if was received     --------------------------------------  Message History  Account: [id number]  Taken:  Thu 2021 12:55p TO  Serial#: [id number]

## 2021-02-05 NOTE — TELEPHONE ENCOUNTER
S/W pt, gave fax number for him to resend the referral request. Mailed it to [facility]. Will reach out to [facility] office tomorrow.

## 2021-10-11 NOTE — LETTER
[facility]   [address] [place] [address]  Phone: [phone]  Fax: [phone]              Lobo Jones  1960    Encounter Date: 2/16/2018    Alysa Thakkar M.D.          PROGRESS NOTE:  Subjective:   Loob Jones is a pleasant 56 y.o. male with known history of   1. NICMP LVEF 26% based on coronary angiogram performed in December 2003, Echocardiogram from 2011 normal LV function   2. Mitral regurgitation  3. Hypertension  4. Dyslipidemia   Presenting today for follow-up evaluation of NICMP.    Blood pressure is elevated today. Palpation at home systolic blood pressures are usually around 120s 130s. Denied any complaints of exertional chest pain, pressure or tightness. No complaints of dizziness, lightheadedness or LOC. Denied any complaints of cough while on enalapril. No complaints of lower extremity edema or claudication.      Past Medical History:   Diagnosis Date   • Arrhythmia     hx of irreg heart rate   • Dental disorder     upper partial   • Hyperlipidemia    • Hypertension    • Nonischemic cardiomyopathy (CMS-HCC)      Past Surgical History:   Procedure Laterality Date   • COLONOSCOPY - ENDO  1/12/2015    Performed by Mariano Li M.D. at SURGERY [facility] ORS   • INGUINAL HERNIA REPAIR      x2     No family history on file.  History   Smoking Status   • Former Smoker   • Packs/day: 1.00   • Years: 20.00   • Types: Cigarettes   • Quit date: 1/1/2008   Smokeless Tobacco   • Never Used     No Known Allergies  Outpatient Encounter Prescriptions as of 2/16/2018   Medication Sig Dispense Refill   • enalapril (VASOTEC) 20 MG tablet Take 1 Tab by mouth 2 times a day. 180 Tab 3   • carvedilol (COREG) 25 MG Tab Take 1 Tab by mouth 2 times a day, with meals. 180 Tab 3   • aspirin EC (ECOTRIN) 81 MG TBEC Take 81 mg by mouth every day.     • docosahexanoic acid (OMEGA 3 FA) 1000 MG CAPS Take 1,000 mg by mouth every  [address] [address]  Phone: [phone]  Fax: [phone]    October 11, 2021        Lucy Lee  [address]  [place] [address]          To whom it may concern:    RE: Lucy Lee    Patient was seen and treated today at our clinic and missed work.  Please excuse him from work today and tomorrow.  He may work tomorrow if symptoms rapidly improve.      Please contact me for questions or concerns.      Sincerely,        Linda Ibarra, CNP   day.       No facility-administered encounter medications on file as of 2/16/2018.      Review of Systems   Constitutional: Negative for fever.   HENT: Negative for congestion.    Eyes: Negative for blurred vision.   Respiratory: Negative for cough and shortness of breath.    Cardiovascular: Negative for chest pain, palpitations, orthopnea, claudication, leg swelling and PND.   Gastrointestinal: Negative for abdominal pain.   Musculoskeletal: Negative for joint pain and myalgias.   Skin: Negative for rash.   Neurological: Negative for speech change, loss of consciousness and headaches.   Endo/Heme/Allergies: Does not bruise/bleed easily.   Psychiatric/Behavioral: Negative for depression. The patient is not nervous/anxious.    All other systems reviewed and are negative.       Objective:   /82   Pulse (!) 58   Ht 1.829 m (6')   Wt 90.3 kg (199 lb)   SpO2 95%   BMI 26.99 kg/m²    Repeat blood pressure 140/84  Physical Exam   Constitutional: He is oriented to person, place, and time. He appears well-developed. No distress.   HENT:   Mouth/Throat: Mucous membranes are normal.   Eyes: Conjunctivae and EOM are normal.   Neck: No JVD present. No tracheal deviation present. No thyroid mass present.   Cardiovascular: Normal rate, regular rhythm and intact distal pulses.    Murmur heard.  Soft systolic murmur best heard in the mitral area   Pulmonary/Chest: Effort normal and breath sounds normal. No respiratory distress. He exhibits no tenderness.   Abdominal: Soft. There is no tenderness.   Musculoskeletal: Normal range of motion. He exhibits no edema.   Neurological: He is alert and oriented to person, place, and time. He has normal strength. He displays no tremor.   Skin: Skin is warm and dry. He is not diaphoretic.   Psychiatric: He has a normal mood and affect. His behavior is normal.   Vitals reviewed.  Results for CARISSA BERNAL (MRN [id number]) as of 2/16/2018 09:22   10/7/2016    Sodium 142   Potassium 4.6     Chloride 107   Co2 26   Anion Gap 14   Glucose 111 (H)   Bun 27 (H)   Creatinine 1.1   GFR If Non African American >60   Calcium 8.7   AST(SGOT) 27   ALT(SGPT) 32   Alkaline Phosphatase 71   Cholesterol,Tot 195   Triglycerides 60   HDL 65.0 (H)   Non HDL Cholesterol 130    (H)     Transthoracic echo: 9/26/2011  Mild concentric LVH, systolic function normal, EF 69% no wall motion abnormalities. Abnormal diastolic filling with impaired relaxation.  The evidence of coarctation or aneurysm of thoracic aorta.  Aortic valve tricuspid no evidence of stenosis or regurgitation.  Mild mitral regurgitation  Normal right heart pressures.  No pericardial effusion.    Coronary angiogram: 12/16/2003  LV gram showed global hypokinesis with EF of 26%. No mitral regurgitation.  LM bifurcated into the LAD and LCX which is free of disease.  LAD is a large vessel wraps around the apex no significant disease.  LCX angiographically free of CAD  RCA dominant vessel gives off small PDA and PLV free of disease and  Assessment:     1. NICMP  2. HTN  3. Dyslipidemia  4. Mitral regurgitation     Medical Decision Making:  Today's Assessment / Status / Plan:     1. Patient appears euvolemic.  Continue Coreg and enalapril.  2. Repeat blood pressure dropped to 140/84  Advised patient to monitor blood pressure for one week based on the blood pressure recording will titrate antihypertensive agents.  Interim continue Coreg and enalapril.  3. Dietary modification and continue exercise.  4. Repeat echo in one year prior to next visit.    Followup in one year  Labs in one year, echo prior to next visit.    Thank you for allowing me to participate in taking care of patient.    Alysa Khalil MD.                Miguel Wells M.D.  [address] [address]  VIA In Basket

## 2022-12-20 PROBLEM — I48.20 CHRONIC ATRIAL FIBRILLATION (HCC): Status: ACTIVE | Noted: 2022-01-01

## 2022-12-20 PROBLEM — R09.02 HYPOXEMIA: Status: ACTIVE | Noted: 2022-01-01

## 2022-12-20 PROBLEM — I50.43 ACUTE ON CHRONIC COMBINED SYSTOLIC AND DIASTOLIC CHF (CONGESTIVE HEART FAILURE) (HCC): Status: ACTIVE | Noted: 2022-01-01

## 2022-12-20 PROBLEM — I50.23 ACUTE ON CHRONIC SYSTOLIC CHF (CONGESTIVE HEART FAILURE) (HCC): Status: ACTIVE | Noted: 2022-01-01

## 2022-12-20 PROBLEM — R60.1 ANASARCA: Status: ACTIVE | Noted: 2022-01-01
